# Patient Record
Sex: FEMALE | Race: BLACK OR AFRICAN AMERICAN | NOT HISPANIC OR LATINO | Employment: FULL TIME | URBAN - METROPOLITAN AREA
[De-identification: names, ages, dates, MRNs, and addresses within clinical notes are randomized per-mention and may not be internally consistent; named-entity substitution may affect disease eponyms.]

---

## 2019-11-18 ENCOUNTER — OFFICE VISIT (OUTPATIENT)
Dept: URGENT CARE | Facility: CLINIC | Age: 49
End: 2019-11-18
Payer: OTHER MISCELLANEOUS

## 2019-11-18 VITALS
TEMPERATURE: 97 F | RESPIRATION RATE: 16 BRPM | DIASTOLIC BLOOD PRESSURE: 95 MMHG | HEIGHT: 62 IN | BODY MASS INDEX: 24.48 KG/M2 | OXYGEN SATURATION: 98 % | WEIGHT: 133 LBS | HEART RATE: 61 BPM | SYSTOLIC BLOOD PRESSURE: 157 MMHG

## 2019-11-18 DIAGNOSIS — M54.2 NECK PAIN, BILATERAL: Primary | ICD-10-CM

## 2019-11-18 PROCEDURE — 99203 PR OFFICE/OUTPT VISIT, NEW, LEVL III, 30-44 MIN: ICD-10-PCS | Mod: S$GLB,,, | Performed by: PHYSICIAN ASSISTANT

## 2019-11-18 PROCEDURE — 99203 OFFICE O/P NEW LOW 30 MIN: CPT | Mod: S$GLB,,, | Performed by: PHYSICIAN ASSISTANT

## 2019-11-18 RX ORDER — ACETAMINOPHEN 500 MG
500 TABLET ORAL EVERY 6 HOURS PRN
COMMUNITY
End: 2019-11-19

## 2019-11-18 RX ORDER — MELOXICAM 7.5 MG/1
7.5 TABLET ORAL DAILY
COMMUNITY
End: 2019-11-22 | Stop reason: ALTCHOICE

## 2019-11-18 RX ORDER — IBUPROFEN 800 MG/1
800 TABLET ORAL 3 TIMES DAILY
COMMUNITY
End: 2019-11-22 | Stop reason: SDUPTHER

## 2019-11-18 RX ORDER — CYCLOBENZAPRINE HCL 5 MG
5 TABLET ORAL 3 TIMES DAILY PRN
COMMUNITY
End: 2019-12-27 | Stop reason: SDUPTHER

## 2019-11-18 NOTE — PATIENT INSTRUCTIONS
Neck Pain    There are several possible causes of neck pain when there is no injury:  · You can get a minor ligament sprain or muscle strain from a sudden minor neck movement. Sleeping with your neck in an awkward position can also cause this.  · Some people respond to emotional stress by tensing the muscles of their neck, shoulders, and upper back. Chronic spasm in these muscles can cause neck pain and sometimes headaches.  · Gradual wear and tear of the joints in the spine can cause degenerative arthritis. This can be a source of occasional or chronic neck pain.  · The spinal disks may bulge and put pressure on a nearby spinal nerve. This can happen as a natural result of aging or repeated small injuries to the neck. The spinal disks are the cushions between each spinal bone. This causes tingling, pain, or numbness that spreads from the neck to the shoulder, arm, or hand on one side.  Acute neck pain usually gets better in 1 to 2 weeks. Neck pain related to disk disease, arthritis in the spinal joints, or spinal stenosis can become chronic and last for months or years. Spinal stenosis is narrowing of the spinal canal.  X-rays are usually not ordered for the initial evaluation of neck pain. However, X-rays may be done if you had a forceful physical injury, such as a car accident or fall. If pain continues and doesnt respond to medical treatment, X-rays and other tests may be done at a later time.  Home care  · Rest and relax the muscles. Use a comfortable pillow that supports the head. It should also help keep the spine in a neutral position. The position of the head should not be tilted forward or backward. A rolled up towel may help for a custom fit.  · Some people find relief with heat. Heat can be applied with either a warm shower or bath or a moist towel heated in the microwave and massage. Others prefer cold packs. You can make an ice pack by filling a plastic bag that seals at the top with ice cubes or  crushed ice and then wrapping it with a thin towel. Try both and use the method that feels best for 15 to 20 minutes, several times a day.  · Whether using ice or heat, be careful that you do not injure your skin. Never put ice directly on the skin. Always wrap the ice in a towel or other type of cloth.This is very important, especially in people with poor skin sensations.   · Try to reduce your stress level. Emotional stress can lead to neck muscle tension and get in the way of or delay the healing process.  · You may use over-the-counter pain medicine to control pain, unless another medicine was prescribed. If you have chronic liver or kidney disease or ever had a stomach ulcer or GI bleeding, talk with your healthcare provider before using these medicines.  Follow-up care  Follow up with your healthcare provider if your symptoms do not show signs of improvement after one week. Physical therapy or further tests may be needed.  If X-rays, CT scans, or MRI scans were taken, you will be told of any new findings that may affect your care.  Call 911  Call 911 if you have:  · Sudden weakness or numbness in one or both arms  · Neck swelling, difficulty or painful swallowing  · Difficulty breathing  · Chest pain  When to seek medical advice  Call your healthcare provider right away if any of these occur:  · Pain becomes worse or spreads into one or both arm  · Increasing headache  · Fever of 100.4°F (38°C) or above lasting for 24 to 48 hours  Date Last Reviewed: 7/1/2016  © 4933-5520 [employer]. [address] [place] [address], [place] [address]. All rights reserved. This information is not intended as a substitute for professional medical care. Always follow your healthcare professional's instructions.      You have been seen for a work-related injury/ailment. Please return to work as instructed on discharge paperwork. If you have been given restrictions, please follow instructions has advised. It is important that  you follow up at one of the Occupational Health Clinics in the next business day if further treatment or evaluation is needed.   Please call [email or web address] for help setting up the appointment.  A list of clinics is listed below:    - [address], [place] [address] ([phone])  - [address] [address] ([phone])  - [address] [place] [address] ([phone])  - [address], [place] [address] ([phone])

## 2019-11-18 NOTE — PROGRESS NOTES
"Subjective:       Patient ID: Ann Marie Blue is a 49 y.o. female.    Vitals:  height is 5' 2" (1.575 m) and weight is 60.3 kg (133 lb). Her temperature is 97 °F (36.1 °C). Her blood pressure is 157/95 (abnormal) and her pulse is 61. Her respiration is 16 and oxygen saturation is 98%.     Chief Complaint: Back Pain (upper/lower)    Patient states that she was injured at work on 11/03 while trying to carry a heavy tray through a small space and pushing a heavy cart.  She reports pain and burning sensation there is in her upper neck and upper back bilaterally.  Pain does not radiate into extremities.  Pain is worse with movement or heavy lifting.  She denies any numbness/tingling, weakness, bowel or bladder incontinence.  Patient was already seen by her PCP for the injury.  She was given ibuprofen 800 mg as well as Flexeril.  She says that her symptoms improved greatly with medications however the Flexeril makes her drowsy should she does not take it all the time.  She also states that she has an appointment with a chiropractor.  Her company recently switched ownership and that is why she is now being seen for work comp injury.    Back Pain   This is a new problem. Episode onset: 10 days  The problem occurs intermittently. The problem has been gradually worsening since onset. The pain is present in the lumbar spine. The quality of the pain is described as burning. Radiates to: radiates to lower back  The pain is at a severity of 10/10. The pain is severe. The pain is worse during the day. The symptoms are aggravated by position. Stiffness is present all day. Pertinent negatives include no chest pain, dysuria, fever, headaches or weakness. She has tried NSAIDs, muscle relaxant and home exercises for the symptoms. The treatment provided mild relief.       Constitution: Negative for chills, fatigue and fever.   HENT: Negative for congestion and sore throat.    Neck: Negative for painful lymph nodes.   Cardiovascular: " Negative for chest pain and leg swelling.   Eyes: Negative for double vision and blurred vision.   Respiratory: Negative for cough and shortness of breath.    Gastrointestinal: Negative for nausea, vomiting and diarrhea.   Genitourinary: Negative for dysuria, frequency, urgency and history of kidney stones.   Musculoskeletal: Positive for back pain, muscle cramps and muscle ache. Negative for joint pain and joint swelling.   Skin: Negative for color change, pale, rash and bruising.   Allergic/Immunologic: Negative for seasonal allergies.   Neurological: Negative for dizziness, history of vertigo, light-headedness, passing out and headaches.   Hematologic/Lymphatic: Negative for swollen lymph nodes.   Psychiatric/Behavioral: Negative for nervous/anxious, sleep disturbance and depression. The patient is not nervous/anxious.        Objective:      Physical Exam   Constitutional: She is oriented to person, place, and time. Vital signs are normal. She appears well-developed and well-nourished. She is active and cooperative. No distress.   HENT:   Head: Normocephalic and atraumatic.   Nose: Nose normal.   Mouth/Throat: Oropharynx is clear and moist and mucous membranes are normal.   Eyes: Conjunctivae and lids are normal.   Neck: Trachea normal, normal range of motion, full passive range of motion without pain and phonation normal. Neck supple. Spinous process tenderness and muscular tenderness present. No neck rigidity. No edema and normal range of motion present.   Cardiovascular: Normal rate, regular rhythm, normal heart sounds, intact distal pulses and normal pulses.   Pulmonary/Chest: Effort normal and breath sounds normal.   Abdominal: Soft. Normal appearance and bowel sounds are normal. She exhibits no abdominal bruit, no pulsatile midline mass and no mass.   Musculoskeletal: She exhibits no edema or deformity.        Thoracic back: She exhibits tenderness. She exhibits normal range of motion, no swelling and no  spasm.        Lumbar back: Normal.   Neurological: She is alert and oriented to person, place, and time. She has normal strength and normal reflexes. No sensory deficit. Gait normal.   No Hahn's bilaterally.    Skin: Skin is warm, dry, intact and not diaphoretic.   Psychiatric: She has a normal mood and affect. Her speech is normal and behavior is normal. Judgment and thought content normal. Cognition and memory are normal.   Nursing note and vitals reviewed.        Assessment:       1. Neck pain, bilateral        Plan:       Patient instructed to continue the ibuprofen and Flexeril as needed for pain. Continue applying heat and doing daily exercises. She can [facility] up with [facility] if further treatment is needed and pain continues.  Otherwise return to full duty.    Neck pain, bilateral  -     Ambulatory referral to Occupational Medicine      Patient Instructions     Neck Pain    There are several possible causes of neck pain when there is no injury:  · You can get a minor ligament sprain or muscle strain from a sudden minor neck movement. Sleeping with your neck in an awkward position can also cause this.  · Some people respond to emotional stress by tensing the muscles of their neck, shoulders, and upper back. Chronic spasm in these muscles can cause neck pain and sometimes headaches.  · Gradual wear and tear of the joints in the spine can cause degenerative arthritis. This can be a source of occasional or chronic neck pain.  · The spinal disks may bulge and put pressure on a nearby spinal nerve. This can happen as a natural result of aging or repeated small injuries to the neck. The spinal disks are the cushions between each spinal bone. This causes tingling, pain, or numbness that spreads from the neck to the shoulder, arm, or hand on one side.  Acute neck pain usually gets better in 1 to 2 weeks. Neck pain related to disk disease, arthritis in the spinal joints, or spinal stenosis can become chronic and  last for months or years. Spinal stenosis is narrowing of the spinal canal.  X-rays are usually not ordered for the initial evaluation of neck pain. However, X-rays may be done if you had a forceful physical injury, such as a car accident or fall. If pain continues and doesnt respond to medical treatment, X-rays and other tests may be done at a later time.  Home care  · Rest and relax the muscles. Use a comfortable pillow that supports the head. It should also help keep the spine in a neutral position. The position of the head should not be tilted forward or backward. A rolled up towel may help for a custom fit.  · Some people find relief with heat. Heat can be applied with either a warm shower or bath or a moist towel heated in the microwave and massage. Others prefer cold packs. You can make an ice pack by filling a plastic bag that seals at the top with ice cubes or crushed ice and then wrapping it with a thin towel. Try both and use the method that feels best for 15 to 20 minutes, several times a day.  · Whether using ice or heat, be careful that you do not injure your skin. Never put ice directly on the skin. Always wrap the ice in a towel or other type of cloth.This is very important, especially in people with poor skin sensations.   · Try to reduce your stress level. Emotional stress can lead to neck muscle tension and get in the way of or delay the healing process.  · You may use over-the-counter pain medicine to control pain, unless another medicine was prescribed. If you have chronic liver or kidney disease or ever had a stomach ulcer or GI bleeding, talk with your healthcare provider before using these medicines.  Follow-up care  Follow up with your healthcare provider if your symptoms do not show signs of improvement after one week. Physical therapy or further tests may be needed.  If X-rays, CT scans, or MRI scans were taken, you will be told of any new findings that may affect your care.  Call  911  Call 911 if you have:  · Sudden weakness or numbness in one or both arms  · Neck swelling, difficulty or painful swallowing  · Difficulty breathing  · Chest pain  When to seek medical advice  Call your healthcare provider right away if any of these occur:  · Pain becomes worse or spreads into one or both arm  · Increasing headache  · Fever of 100.4°F (38°C) or above lasting for 24 to 48 hours  Date Last Reviewed: 7/1/2016  © 0747-0013 [employer]. [address] [place] [address]. All rights reserved. This information is not intended as a substitute for professional medical care. Always follow your healthcare professional's instructions.      You have been seen for a work-related injury/ailment. Please return to work as instructed on discharge paperwork. If you have been given restrictions, please follow instructions has advised. It is important that you follow up at one of the Occupational Health Clinics in the next business day if further treatment or evaluation is needed.   Please call [email or web address] for help setting up the appointment.  A list of clinics is listed below:    - [address] [facility] [address], [place] [address] ([phone])  - [address] [address] ([phone])  - [address] [place] [address] ([phone])  - [address], [place] [address] ([phone])

## 2019-11-18 NOTE — LETTER
[facility]  [address] [facility] [address]  Phone: [phone]  Fax: [phone]  [facility]: [email or web address]    Pt Name: Ann Marie Blue  Injury Date: 10/27/2019   Employee ID:  Date of First Treatment: 11/18/2019   Company: [employer]      Appointment Time: 10:30 AM Arrived: 11:00   Provider: Erin Segovia PA-C Time Out:11:45     Office Treatment:   1. Neck pain, bilateral          Patient Instructions: Daily home exercises/warm soaks    Restrictions: Regular Duty     Return Appointment: Follow up as needed

## 2019-11-19 ENCOUNTER — OFFICE VISIT (OUTPATIENT)
Dept: URGENT CARE | Facility: CLINIC | Age: 49
End: 2019-11-19
Payer: OTHER MISCELLANEOUS

## 2019-11-19 VITALS — BODY MASS INDEX: 24.33 KG/M2 | WEIGHT: 133 LBS

## 2019-11-19 DIAGNOSIS — M79.18 MYOFASCIAL MUSCLE PAIN: ICD-10-CM

## 2019-11-19 DIAGNOSIS — S16.1XXA STRAIN OF NECK MUSCLE, INITIAL ENCOUNTER: Primary | ICD-10-CM

## 2019-11-19 PROCEDURE — 99213 OFFICE O/P EST LOW 20 MIN: CPT | Mod: S$GLB,,, | Performed by: FAMILY MEDICINE

## 2019-11-19 PROCEDURE — 72040 XR CERVICAL SPINE 2 OR 3 VIEWS: ICD-10-PCS | Mod: S$GLB,,, | Performed by: RADIOLOGY

## 2019-11-19 PROCEDURE — 72040 X-RAY EXAM NECK SPINE 2-3 VW: CPT | Mod: S$GLB,,, | Performed by: RADIOLOGY

## 2019-11-19 PROCEDURE — 99213 PR OFFICE/OUTPT VISIT, EST, LEVL III, 20-29 MIN: ICD-10-PCS | Mod: S$GLB,,, | Performed by: FAMILY MEDICINE

## 2019-11-19 RX ORDER — METHOCARBAMOL 500 MG/1
500 TABLET, FILM COATED ORAL 4 TIMES DAILY
Qty: 40 TABLET | Refills: 0 | Status: SHIPPED | OUTPATIENT
Start: 2019-11-19 | End: 2019-11-19

## 2019-11-19 RX ORDER — ACETAMINOPHEN AND CODEINE PHOSPHATE 300; 60 MG/1; MG/1
TABLET ORAL
Refills: 0 | COMMUNITY
Start: 2019-11-06 | End: 2019-12-27 | Stop reason: SDUPTHER

## 2019-11-19 RX ORDER — METHOCARBAMOL 500 MG/1
500 TABLET, FILM COATED ORAL 4 TIMES DAILY
Qty: 40 TABLET | Refills: 0 | Status: SHIPPED | OUTPATIENT
Start: 2019-11-19 | End: 2019-11-22 | Stop reason: SDUPTHER

## 2019-11-19 RX ORDER — EMTRICITABINE AND TENOFOVIR DISOPROXIL FUMARATE 200; 300 MG/1; MG/1
TABLET, FILM COATED ORAL
Refills: 2 | COMMUNITY
Start: 2019-11-06

## 2019-11-19 NOTE — PROGRESS NOTES
Subjective:       Patient ID: Ann Marie Blue is a 49 y.o. female.    Chief Complaint: Work Related Injury    49-year-old woman who works as a  at the [employer] presents to Occupational Health for follow-up of a neck injury initially sustained on November 3rd.  Patient reports initially injuring her neck  when lifting a heavy object.  Patient reports bilateral neck pain associated with a burning sensation and crawling sensation.  Pain extends to patient's right shoulder and affects both sides of her mid back.  Patient initially saw her primary care physician who prescribed her ibuprofen and cyclobenzaprine.  Patient is also applying a heating pad using topical analgesics and stretching. Patient's work instructed her to attend Occupational Health for further review. Patient reports cyclobenzaprine makes her sleepy and she is unable to take it at work.    Injury   This is a new problem. Episode onset: 11/3. The problem occurs constantly. The problem has been gradually worsening. Associated symptoms include neck pain. Pertinent negatives include no abdominal pain, chest pain, chills, fever, headaches, nausea, rash, sore throat or vomiting. She has tried acetaminophen and NSAIDs (muscle relaxer) for the symptoms. The treatment provided mild relief.     Review of Systems   Constitution: Negative for chills and fever.   HENT: Negative for sore throat.    Eyes: Negative for blurred vision.   Cardiovascular: Negative for chest pain.   Respiratory: Negative for shortness of breath.    Skin: Negative for rash.   Musculoskeletal: Positive for joint pain, neck pain and stiffness. Negative for back pain.   Gastrointestinal: Negative for abdominal pain, diarrhea, nausea and vomiting.   Neurological: Negative for headaches.   Psychiatric/Behavioral: The patient is not nervous/anxious.        Objective:      Physical Exam   Constitutional: She is oriented to person, place, and time. She appears well-developed and  well-nourished.   HENT:   Head: Normocephalic and atraumatic.   Eyes: Pupils are equal, round, and reactive to light. Conjunctivae and EOM are normal.   Neck: Normal range of motion. Neck supple.   Musculoskeletal: Normal range of motion.        Cervical back: She exhibits tenderness and spasm. She exhibits normal range of motion.        Back:    Pt examined post muscle relaxant. Slight DROM throughout. Limited right  arm extension to 5*. B/l upper back tenderness a/w slight spasm   Neurological: She is alert and oriented to person, place, and time.   Skin: Skin is warm and dry.   Psychiatric: She has a normal mood and affect. Her behavior is normal. Judgment and thought content normal.             Assessment:       1. Strain of neck muscle, initial encounter    2. Myofascial muscle pain        Plan:         Medications Ordered This Encounter   Medications    methocarbamol (ROBAXIN) 500 MG Tab     Sig: Take 1 tablet (500 mg total) by mouth 4 (four) times daily. for 10 days     Dispense:  40 tablet     Refill:  0     Patient Instructions: Daily home exercises/warm soaks, PT to be scheduled once authorized, Begin Physical Therapy(Continue to use warm compress and topical cream. Continue to take ibuprofen. Continue to stretch. Take the new muscle relaxent I prescribed along with the ibuprofen.)   Restrictions: No lifting/pushing/pulling more than 10 lbs, No above the shoulder/overhead work(Pt is not to lift anything above the shoulder. Patient is to only carry weight less than 10 lbs. These restricitons are until the patient is reassessed by Occupational Health in 4 days.)  Follow up in about 3 days (around 11/22/2019).

## 2019-11-19 NOTE — LETTER
[facility] [place] [address] [facility], [place] [address]  Phone: [phone]  Fax: [phone]  [facility]: [email or web address]    Pt Name: Ann Marie Bule  Injury Date: 10/27/2019   Employee ID:  Date of First Treatment: 11/19/2019   Company: [employer]      Appointment Time: 01:45 PM Arrived: 1:47pm   Provider: [facility] Time Out:3:30pm     Office Treatment:   1. Strain of neck muscle, initial encounter    2. Myofascial muscle pain      Medications Ordered This Encounter   Medications    methocarbamol (ROBAXIN) 500 MG Tab      Patient Instructions: Daily home exercises/warm soaks, PT to be scheduled once authorized, Begin Physical Therapy(Continue to use warm compress and topical cream. Continue to take ibuprofen. Continue to stretch. Take the new muscle relaxent I prescribed along with the ibuprofen.)    Restrictions: No lifting/pushing/pulling more than 10 lbs, No above the shoulder/overhead work(Pt is not to lift anything above the shoulder. Patient is to only carry weight less than 10 lbs. These restricitons are until the patient is reassessed by Occupational Health in 4 days.)     Return Appointment: Visit date not found at ***

## 2019-11-19 NOTE — LETTER
[facility] [place] [address] [address] [facility], [place] [address]  Phone: [phone]  Fax: [phone]  [facility]: [email or web address]    Pt Name: Ann Marie Neal Date: 10/27/2019   Employee ID:  Date of First Treatment: 11/19/2019   Company: [employer]      Appointment Time: 01:45 PM Arrived: 1:47   Provider: [facility] Time Out:3:30pm     Office Treatment:   1. Strain of neck muscle, initial encounter    2. Myofascial muscle pain      Medications Ordered This Encounter   Medications    methocarbamol (ROBAXIN) 500 MG Tab      Patient Instructions: Daily home exercises/warm soaks, PT to be scheduled once authorized, Begin Physical Therapy(Continue to use warm compress and topical cream. Continue to take ibuprofen. Continue to stretch. Take the new muscle relaxent I prescribed along with the ibuprofen.)    Restrictions: No lifting/pushing/pulling more than 10 lbs, No above the shoulder/overhead work(Pt is not to lift anything above the shoulder. Patient is to only carry weight less than 10 lbs. These restricitons are until the patient is reassessed by Occupational Health in 4 days.)     Return Appointment: Nov 22 at 2:30 pm

## 2019-11-19 NOTE — LETTER
[facility] [place] [address] [address] [facility], [place] [address]  Phone: [phone]  Fax: [phone]  [facility]: [email or web address]    Pt Name: Ann Marie Neal Date: 11/03/2019   Employee ID:  Date of First Treatment: 11/19/2019   Company: [employer]      Appointment Time: 01:45 PM Arrived: 1:47   Provider: [facility] Time Out:3:30pm     Office Treatment:   1. Strain of neck muscle, initial encounter    2. Myofascial muscle pain      Medications Ordered This Encounter   Medications    methocarbamol (ROBAXIN) 500 MG Tab      Patient Instructions: Daily home exercises/warm soaks, PT to be scheduled once authorized, Begin Physical Therapy(Continue to use warm compress and topical cream. Continue to take ibuprofen. Continue to stretch. Take the new muscle relaxent I prescribed along with the ibuprofen.)    Restrictions: No lifting/pushing/pulling more than 10 lbs, No above the shoulder/overhead work(Pt is not to lift anything above the shoulder. Patient is to only carry weight less than 10 lbs. These restricitons are until the patient is reassessed by Occupational Health in 4 days.)     Return Appointment: 11/22/2019 at 2:30pm

## 2019-11-21 ENCOUNTER — TELEPHONE (OUTPATIENT)
Dept: URGENT CARE | Facility: CLINIC | Age: 49
End: 2019-11-21

## 2019-11-21 NOTE — TELEPHONE ENCOUNTER
Called patient to follow up from her visit, stated feeling a little better after being seen by [facility] since they changed her muscle relaxer

## 2019-11-22 ENCOUNTER — OFFICE VISIT (OUTPATIENT)
Dept: URGENT CARE | Facility: CLINIC | Age: 49
End: 2019-11-22
Payer: COMMERCIAL

## 2019-11-22 VITALS
WEIGHT: 133 LBS | HEIGHT: 62 IN | DIASTOLIC BLOOD PRESSURE: 86 MMHG | HEART RATE: 83 BPM | OXYGEN SATURATION: 98 % | BODY MASS INDEX: 24.48 KG/M2 | SYSTOLIC BLOOD PRESSURE: 122 MMHG

## 2019-11-22 DIAGNOSIS — M54.2 NECK PAIN, BILATERAL: ICD-10-CM

## 2019-11-22 DIAGNOSIS — S16.1XXA STRAIN OF NECK MUSCLE, INITIAL ENCOUNTER: Primary | ICD-10-CM

## 2019-11-22 DIAGNOSIS — M79.18 MYOFASCIAL MUSCLE PAIN: ICD-10-CM

## 2019-11-22 PROCEDURE — 99214 OFFICE O/P EST MOD 30 MIN: CPT | Mod: S$GLB,,, | Performed by: FAMILY MEDICINE

## 2019-11-22 PROCEDURE — 99214 PR OFFICE/OUTPT VISIT, EST, LEVL IV, 30-39 MIN: ICD-10-PCS | Mod: S$GLB,,, | Performed by: FAMILY MEDICINE

## 2019-11-22 RX ORDER — IBUPROFEN 800 MG/1
800 TABLET ORAL 3 TIMES DAILY
Qty: 60 TABLET | Refills: 0 | Status: SHIPPED | OUTPATIENT
Start: 2019-11-22 | End: 2019-11-29 | Stop reason: ALTCHOICE

## 2019-11-22 RX ORDER — METHOCARBAMOL 500 MG/1
500 TABLET, FILM COATED ORAL 4 TIMES DAILY
Qty: 80 TABLET | Refills: 0 | Status: SHIPPED | OUTPATIENT
Start: 2019-11-22 | End: 2019-12-12

## 2019-11-22 NOTE — LETTER
[facility] [place] [address] [facility], [place] [address]  Phone: [phone]  Fax: [phone]  [facility]: [email or web address]    Pt Name: Ann Marie Neal Date: 11/03/2019   Employee ID:  Date of First Treatment: 11/22/2019   Company: [employer]      Appointment Time: 02:15 PM Arrived: 3:05   Provider: Ayad Nicole MD Time Out:4:00pm     Office Treatment:   1. Strain of neck muscle, initial encounter    2. Myofascial muscle pain    3. Neck pain, bilateral          Patient Instructions: Daily home exercises/warm soaks, PT to be scheduled once authorized, Begin Physical Therapy(Warm compress. Topical Cream. Do Yoga stretches. Take your medicine..)    Restrictions: No lifting/pushing/pulling more than 10 lbs, No above the shoulder/overhead work((Pt is not to lift anything above the shoulder. Patient is to only carry weight less than 10 lbs. These restricitons are until the patient is reassessed by Occupational Health in 7 days.))     Return Appointment: 11/29/2019 at 12

## 2019-11-22 NOTE — PROGRESS NOTES
Subjective:       Patient ID: Ann Marie Blue is a 49 y.o. female.    Chief Complaint: Work Related Injury    Pt is her for right shoulder/neck pain. Things seem to be slowly improving. Pt has less burning sensation in the neck & shoulder today.    49-year-old female presents to Urgent Care for follow-up of her neck strain.  Patient feels much better since the time since her last visit.  Patient reports the Robaxin muscle relaxant does not make her as drowsy as the Flexeril.  Patient continues to take ibuprofen, use warm compress, and use topical creams.  Patient's daughter is a  who lives in [place].  Patient can turn to her daughter for advice on neck stretches.    Injury   This is a new problem. Episode onset: 11/3. The problem occurs intermittently. The problem has been gradually improving. Associated symptoms include neck pain and numbness. Pertinent negatives include no abdominal pain, chest pain, chills, fever, headaches, nausea, rash, sore throat or vomiting. She has tried NSAIDs, acetaminophen, heat and ice (muscle relaxer/topical) for the symptoms. The treatment provided mild relief.     Review of Systems   Constitution: Negative for chills and fever.   HENT: Negative for sore throat.    Eyes: Negative for blurred vision.   Cardiovascular: Negative for chest pain.   Respiratory: Negative for shortness of breath.    Skin: Negative for rash.   Musculoskeletal: Positive for joint pain and neck pain. Negative for back pain.   Gastrointestinal: Negative for abdominal pain, diarrhea, nausea and vomiting.   Neurological: Positive for numbness. Negative for headaches.   Psychiatric/Behavioral: The patient is not nervous/anxious.        Objective:      Physical Exam   Constitutional: She is oriented to person, place, and time. She appears well-developed and well-nourished.   HENT:   Head: Normocephalic and atraumatic.   Eyes: Pupils are equal, round, and reactive to light. Conjunctivae and EOM are  normal.   Neck: Normal range of motion. Neck supple.   Musculoskeletal: Normal range of motion.        Cervical back: She exhibits tenderness and spasm. She exhibits normal range of motion.        Back:    Pt examined post muscle relaxant. Slight DROM throughout. Limited right  arm extension to 5*. B/l upper back tenderness a/w slight spasm   Neurological: She is alert and oriented to person, place, and time.   Skin: Skin is warm and dry.   Psychiatric: She has a normal mood and affect. Her behavior is normal. Judgment and thought content normal.       Assessment:       1. Strain of neck muscle, initial encounter    2. Myofascial muscle pain    3. Neck pain, bilateral        Plan:       Medication adequately titrated.  Awaiting PT.  For follow-up in 1 week.  Work restrictions adequate.  Will consider lifting work restrictions at time of next visit.      Medications Ordered This Encounter   Medications    ibuprofen (ADVIL,MOTRIN) 800 MG tablet     Sig: Take 1 tablet (800 mg total) by mouth 3 (three) times daily. for 20 days     Dispense:  60 tablet     Refill:  0    methocarbamol (ROBAXIN) 500 MG Tab     Sig: Take 1 tablet (500 mg total) by mouth 4 (four) times daily. for 20 days     Dispense:  80 tablet     Refill:  0     Patient Instructions: Daily home exercises/warm soaks, PT to be scheduled once authorized, Begin Physical Therapy(Warm compress. Topical Cream. Do Yoga stretches. Take your medicine..)   Restrictions: No lifting/pushing/pulling more than 10 lbs, No above the shoulder/overhead work((Pt is not to lift anything above the shoulder. Patient is to only carry weight less than 10 lbs. These restricitons are until the patient is reassessed by Occupational Health in 7 days.))  Follow up in about 1 week (around 11/29/2019).

## 2019-11-29 ENCOUNTER — OFFICE VISIT (OUTPATIENT)
Dept: URGENT CARE | Facility: CLINIC | Age: 49
End: 2019-11-29
Payer: COMMERCIAL

## 2019-11-29 DIAGNOSIS — M54.2 NECK PAIN, BILATERAL: ICD-10-CM

## 2019-11-29 DIAGNOSIS — M79.18 MYOFASCIAL MUSCLE PAIN: ICD-10-CM

## 2019-11-29 DIAGNOSIS — S16.1XXD STRAIN OF NECK MUSCLE, SUBSEQUENT ENCOUNTER: Primary | ICD-10-CM

## 2019-11-29 DIAGNOSIS — S16.1XXA STRAIN OF NECK MUSCLE, INITIAL ENCOUNTER: ICD-10-CM

## 2019-11-29 PROCEDURE — 99213 PR OFFICE/OUTPT VISIT, EST, LEVL III, 20-29 MIN: ICD-10-PCS | Mod: S$GLB,,, | Performed by: FAMILY MEDICINE

## 2019-11-29 PROCEDURE — 99213 OFFICE O/P EST LOW 20 MIN: CPT | Mod: S$GLB,,, | Performed by: FAMILY MEDICINE

## 2019-11-29 RX ORDER — IBUPROFEN 800 MG/1
800 TABLET ORAL 3 TIMES DAILY
Qty: 60 TABLET | Refills: 0 | Status: SHIPPED | OUTPATIENT
Start: 2019-11-29 | End: 2019-12-19

## 2019-11-29 NOTE — LETTER
[facility] [place] [address] [address] [facility], [place] [address]  Phone: [phone]  Fax: [phone]  [facility]: [id number]    Pt Name: Ann Marie Blue  Injury Date: 11/03/2019   Employee ID:  Date of  Treatment: 11/29/2019   Company: [id number]      Appointment Time: 11:45 AM Arrived: 1157 AM   Provider: Ayad Nicole MD Time Out:1445 PM     Office Treatment:   EXAM  RX  DISABLED      1. Strain of neck muscle, subsequent encounter    2. Myofascial muscle pain    3. Neck pain, bilateral    4. Strain of neck muscle, initial encounter      Medications Ordered This Encounter   Medications    ibuprofen (ADVIL,MOTRIN) 800 MG tablet      Patient Instructions: Daily home exercises/warm soaks, PT to be scheduled once authorized, Begin Physical Therapy(Keep doing what you are doing. You are doing everything correctly. Take Nexium or Prilosec over the counter for your stomach pain. Take tylenol inbetween ibuprofen for pain.)      Restrictions: No above the shoulder/overhead work, No lifting/pushing/pulling more than 10 lbs, Avoid frequent bending/lifting/twisting(Pt is not to perform any reaching abover shoulder level, isn't to perform any lifting with her arms, is not to do any stretching while at work & is to perform absolutley no repetitive exercises like folding. If this can't be followed pt is to be disabled)     Return Appointment: 12/6/2019 at 0900 AM

## 2019-11-29 NOTE — PROGRESS NOTES
Subjective:       Patient ID: Ann Marie Blue is a 49 y.o. female.    Chief Complaint: Shoulder Injury (11/03/2019)    49-year-old female room  at the [facility] for follow-up of for right neck and shoulder injury.  Patient reports marginal improvement with current analgesic regimen.  However patient reports repetitive movements at work that is exacerbating her injury.  Including over head stretches and repetitive folding. Patient complains of burning and tenderness in her right posterior shoulder post work.  Symptoms are slightly alleviated with warm compress, hot showers, stretching, and topical analgesics Patient reports severe drowsiness pose Robaxin muscle relaxant for several days after ingesting medication.  Patient presently taking 1/3 the muscle relaxant per day.  Patient reports temporary relief with ibuprofen and Motrin. She prefers motrin.    Shoulder Injury    The incident occurred at work. Both shoulders are affected. The incident occurred 3 to 5 days ago. The injury mechanism was repetitive motion. The quality of the pain is described as aching and burning. The pain radiates to the right neck. The pain is at a severity of 10/10. The pain is severe. The symptoms are aggravated by movement and overhead lifting. She has tried ice and heat for the symptoms. The treatment provided no relief.     Review of Systems   Musculoskeletal: Positive for joint pain, neck pain and stiffness.       Objective:      Physical Exam   Constitutional: She is oriented to person, place, and time. She appears well-developed and well-nourished.   HENT:   Head: Normocephalic and atraumatic.   Eyes: Pupils are equal, round, and reactive to light. Conjunctivae and EOM are normal.   Neck: Normal range of motion. Neck supple.   Musculoskeletal: Normal range of motion.        Cervical back: She exhibits tenderness and spasm. She exhibits normal range of motion.        Back:    FROM throughout. SLight B/l upper back  tenderness.   Neurological: She is alert and oriented to person, place, and time.   Skin: Skin is warm and dry.   Psychiatric: She has a normal mood and affect. Her behavior is normal. Judgment and thought content normal.       Assessment:       1. Strain of neck muscle, subsequent encounter    2. Myofascial muscle pain    3. Neck pain, bilateral    4. Strain of neck muscle, initial encounter        Plan:         Medications Ordered This Encounter   Medications    ibuprofen (ADVIL,MOTRIN) 800 MG tablet     Sig: Take 1 tablet (800 mg total) by mouth 3 (three) times daily. for 20 days     Dispense:  60 tablet     Refill:  0     Patient Instructions: Daily home exercises/warm soaks, PT to be scheduled once authorized, Begin Physical Therapy(Keep doing what you are doing. You are doing everything correctly. Take Nexium or Prilosec over the counter for your stomach pain. Take tylenol inbetween ibuprofen for pain.)   Restrictions: No above the shoulder/overhead work, No lifting/pushing/pulling more than 10 lbs, Avoid frequent bending/lifting/twisting(Pt is not to perform any reaching abover shoulder level, isn't to perform any lifting with her arms, is not to do any stretching while at work & is to perform absolutley no repetitive exercises like folding. If this can't be followed pt is to be disabled)  Follow up in about 1 week (around 12/6/2019).

## 2019-12-06 ENCOUNTER — OFFICE VISIT (OUTPATIENT)
Dept: URGENT CARE | Facility: CLINIC | Age: 49
End: 2019-12-06
Payer: OTHER MISCELLANEOUS

## 2019-12-06 DIAGNOSIS — M54.2 NECK PAIN, BILATERAL: ICD-10-CM

## 2019-12-06 DIAGNOSIS — S16.1XXD STRAIN OF NECK MUSCLE, SUBSEQUENT ENCOUNTER: Primary | ICD-10-CM

## 2019-12-06 DIAGNOSIS — M79.18 MYOFASCIAL MUSCLE PAIN: ICD-10-CM

## 2019-12-06 PROCEDURE — 99213 OFFICE O/P EST LOW 20 MIN: CPT | Mod: S$GLB,,, | Performed by: FAMILY MEDICINE

## 2019-12-06 PROCEDURE — 99213 PR OFFICE/OUTPT VISIT, EST, LEVL III, 20-29 MIN: ICD-10-PCS | Mod: S$GLB,,, | Performed by: FAMILY MEDICINE

## 2019-12-06 NOTE — LETTER
[facility] [address], [address] [place] [address]  Phone: [phone]  Fax: [phone]  [facility]: [email or web address]    Pt Name: Ann Marie Neal Date: 11/03/2019   Employee ID: [id number] Date of  Treatment: 12/06/2019   Company: [employer]      Appointment Time: 08:45 AM Arrived: 9:06am   Provider: Ayad Nicole MD Time Out: 10:25am     Office Treatment:   EXAM  PENDING PT  MODIFIED DUTY    1. Strain of neck muscle, subsequent encounter    2. Myofascial muscle pain    3. Neck pain, bilateral          Patient Instructions: Elevated affected area, PT to be scheduled once authorized(Take your medication on a full stomach and take over the counter Nexium or Prilosec to avoid stomach pain. Continue to stretch, apply warm compress, and use topical cream as needed. You are improving greatly, but you need Physical Therapy asap)      Restrictions: Avoid frequent bending/lifting/twisting, No lifting/pushing/pulling more than 10 lbs, No above the shoulder/overhead work(Pt is not to perform any reaching abover shoulder level, isn't to perform any lifting with her arms, & is to perform absolutley no repetitive exercises like folding. If this can't be followed pt is to be disabled. Patient is to start Physical Therap asap)     Return Appointment: 12/16/19 @ 11:00AM

## 2019-12-06 NOTE — PROGRESS NOTES
"Subjective:       Patient ID: Ann Marie Blue is a 49 y.o. female.    Chief Complaint: Shoulder Injury    49-year-old female room  at the [facility] for follow-up of for right neck and shoulder injury.  Patient reports significant improvement sine last visit. Reports muscle "burning" has subsided and only has a 7/10 muscle pain. Patient reports full range of motion in her back and neck. Patient uses warm compress, hot showers, stretching, and topical analgesics.  Patient's daughter is  and she gets advice from her daughter.  Patient reports adequate pain therapy with ibuprofen and Robaxin once PRN.  Patient does not report need a prescription refill.  Patient reports work restrictions finally being honored as she has been moved to a  position.    Shoulder Injury    The incident occurred at work. Both shoulders are affected. The incident occurred more than 1 week ago. The injury mechanism was repetitive motion. The quality of the pain is described as aching. The pain radiates to the right neck and left neck. The pain is at a severity of 7/10. The pain is moderate. The symptoms are aggravated by overhead lifting. She has tried ice and heat for the symptoms. The treatment provided moderate relief.       Musculoskeletal: Positive for joint pain.        Objective:      Physical Exam   Constitutional: She is oriented to person, place, and time. She appears well-developed and well-nourished.   HENT:   Head: Normocephalic and atraumatic.   Eyes: Pupils are equal, round, and reactive to light. Conjunctivae and EOM are normal.   Neck: Normal range of motion. Neck supple.   Musculoskeletal: Normal range of motion.        Cervical back: She exhibits tenderness and spasm. She exhibits normal range of motion.        Back:    FROM throughout. SLight B/l upper back tenderness.   Neurological: She is alert and oriented to person, place, and time.   Skin: Skin is warm and dry.   Psychiatric: " She has a normal mood and affect. Her behavior is normal. Judgment and thought content normal.       Assessment:       1. Strain of neck muscle, subsequent encounter    2. Myofascial muscle pain    3. Neck pain, bilateral        Patient reports that her work is slow to respond to physician instructions.    Plan:       Plan is to keep the patient on present work restrictions until physical therapy is approved.  Current analgesic regimen is appropriate.  Patient is eager to return to regular work.     Patient Instructions: Elevated affected area, PT to be scheduled once authorized(Take your medication on a full stomach and take over the counter Nexium or Prilosec to avoid stomach pain. Continue to stretch, apply warm compress, and use topical cream as needed. You are improving greatly, but you need Physical Therapy asap)   Restrictions: Avoid frequent bending/lifting/twisting, No lifting/pushing/pulling more than 10 lbs, No above the shoulder/overhead work(Pt is not to perform any reaching abover shoulder level, isn't to perform any lifting with her arms, & is to perform absolutley no repetitive exercises like folding. If this can't be followed pt is to be disabled. Patient is to start Physical Therap asap)  Follow up in about 10 days (around 12/16/2019).

## 2019-12-16 ENCOUNTER — OFFICE VISIT (OUTPATIENT)
Dept: URGENT CARE | Facility: CLINIC | Age: 49
End: 2019-12-16
Payer: OTHER MISCELLANEOUS

## 2019-12-16 DIAGNOSIS — M79.18 MYOFASCIAL MUSCLE PAIN: ICD-10-CM

## 2019-12-16 DIAGNOSIS — S46.911D STRAIN OF RIGHT SHOULDER, SUBSEQUENT ENCOUNTER: ICD-10-CM

## 2019-12-16 DIAGNOSIS — M54.2 NECK PAIN, BILATERAL: ICD-10-CM

## 2019-12-16 DIAGNOSIS — S16.1XXD STRAIN OF NECK MUSCLE, SUBSEQUENT ENCOUNTER: Primary | ICD-10-CM

## 2019-12-16 PROCEDURE — 99213 PR OFFICE/OUTPT VISIT, EST, LEVL III, 20-29 MIN: ICD-10-PCS | Mod: 25,S$GLB,, | Performed by: FAMILY MEDICINE

## 2019-12-16 PROCEDURE — 99213 OFFICE O/P EST LOW 20 MIN: CPT | Mod: 25,S$GLB,, | Performed by: FAMILY MEDICINE

## 2019-12-16 NOTE — LETTER
[facility] [place] [address] [address] [facility], [address] [place] [address]  Phone: [phone]  Fax: [phone]  [facility]: [id number]    Pt Name: Ann Marie Neal Date: 11/03/2019   Employee ID:  Date of  Treatment: 12/16/2019   Company:[[employer]      Appointment Time: 10:45 AM Arrived:12:10 pm   Provider: Ayad Nicole MD Time Out     Office Treatment:   Exam  Light duty        1. Strain of neck muscle, subsequent encounter    2. Myofascial muscle pain    3. Neck pain, bilateral    4. Strain of right shoulder, subsequent encounter          Patient Instructions: Continue Physical Therapy, Daily home exercises/warm soaks      Restrictions: No lifting/pushing/pulling more than 10 lbs, No above the shoulder/overhead work, Limited use of right hand and arm, Avoid frequent bending/lifting/twisting(Patient is not to perform any reaching abover shoulder level. Patient is not to perform any lifting with her arms. Patient is not to perform any repetitive exercises like folding.)     Return Appointment: 12/27/2019 at 09:00   am

## 2019-12-16 NOTE — PROGRESS NOTES
"Subjective:       Patient ID: Ann Marie Blue is a 49 y.o. female.    Chief Complaint: Shoulder Injury      49-year-old female room  at the [facility] for follow-up of for neck, back, and shoulder injury.  Patient happy with PT x1 that she started 4 days prior. Reports muscle "burning" in her neck with right posterior suprascapular pain and right thorax pain. Patient reports full range of motion in her back and neck. Continues to utilize nonpharmacological treatments.  Patient reports adequate pain therapy with ibuprofen only.  Does not report need a prescription refill.  Patient optimistic she can return to regular work duty in 2-3 weeks.    Shoulder Injury    The incident occurred at work. The incident occurred more than 1 week ago. The injury mechanism was overhead work. The quality of the pain is described as burning and aching. The pain does not radiate. The pain is at a severity of 8/10. The pain is moderate. The symptoms are aggravated by overhead lifting. She has tried ice and heat for the symptoms. The treatment provided moderate relief.       Musculoskeletal: Positive for joint pain.        Objective:      Physical Exam   Constitutional: She is oriented to person, place, and time. She appears well-developed and well-nourished.   HENT:   Head: Normocephalic and atraumatic.   Eyes: Pupils are equal, round, and reactive to light. Conjunctivae and EOM are normal.   Neck: Normal range of motion. Neck supple.   Musculoskeletal: Normal range of motion.        Cervical back: She exhibits tenderness and spasm. She exhibits normal range of motion.        Back:         Arms:  FROM throughout. B/l upper back "burning".   Neurological: She is alert and oriented to person, place, and time.   Skin: Skin is warm and dry.   Psychiatric: She has a normal mood and affect. Her behavior is normal. Judgment and thought content normal.       Assessment:       1. Strain of neck muscle, subsequent encounter    2. " Myofascial muscle pain    3. Neck pain, bilateral    4. Strain of right shoulder, subsequent encounter        Optimistic patient    Plan:       Patient to return to clinic in 2 weeks to assess the effect of physical therapy that she recently started.  Patient's pain is managed adequately with ibuprofen.  Continue current work restrictions.     Patient Instructions: Continue Physical Therapy, Daily home exercises/warm soaks   Restrictions: No lifting/pushing/pulling more than 10 lbs, No above the shoulder/overhead work, Limited use of right hand and arm, Avoid frequent bending/lifting/twisting(Patient is not to perform any reaching abover shoulder level. Patient is not to perform any lifting with her arms. Patient is not to perform any repetitive exercises like folding.)  Follow up in about 11 days (around 12/27/2019).

## 2019-12-27 ENCOUNTER — OFFICE VISIT (OUTPATIENT)
Dept: URGENT CARE | Facility: CLINIC | Age: 49
End: 2019-12-27
Payer: OTHER MISCELLANEOUS

## 2019-12-27 DIAGNOSIS — S46.911D STRAIN OF RIGHT SHOULDER, SUBSEQUENT ENCOUNTER: ICD-10-CM

## 2019-12-27 DIAGNOSIS — M54.2 NECK PAIN, BILATERAL: ICD-10-CM

## 2019-12-27 DIAGNOSIS — S16.1XXD STRAIN OF NECK MUSCLE, SUBSEQUENT ENCOUNTER: Primary | ICD-10-CM

## 2019-12-27 DIAGNOSIS — S16.1XXA STRAIN OF NECK MUSCLE, INITIAL ENCOUNTER: ICD-10-CM

## 2019-12-27 DIAGNOSIS — M79.18 MYOFASCIAL MUSCLE PAIN: ICD-10-CM

## 2019-12-27 PROCEDURE — 99213 PR OFFICE/OUTPT VISIT, EST, LEVL III, 20-29 MIN: ICD-10-PCS | Mod: S$GLB,,, | Performed by: FAMILY MEDICINE

## 2019-12-27 PROCEDURE — 99213 OFFICE O/P EST LOW 20 MIN: CPT | Mod: S$GLB,,, | Performed by: FAMILY MEDICINE

## 2019-12-27 RX ORDER — CYCLOBENZAPRINE HCL 5 MG
5 TABLET ORAL 3 TIMES DAILY PRN
Qty: 60 TABLET | Refills: 0 | Status: SHIPPED | OUTPATIENT
Start: 2019-12-27 | End: 2020-01-16

## 2019-12-27 RX ORDER — ACETAMINOPHEN AND CODEINE PHOSPHATE 300; 60 MG/1; MG/1
1 TABLET ORAL EVERY 6 HOURS PRN
Qty: 40 TABLET | Refills: 0 | Status: SHIPPED | OUTPATIENT
Start: 2019-12-27 | End: 2020-01-06
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
## 2020-01-02 ENCOUNTER — OFFICE VISIT (OUTPATIENT)
Dept: URGENT CARE | Facility: CLINIC | Age: 50
End: 2020-01-02
Payer: OTHER MISCELLANEOUS

## 2020-01-02 DIAGNOSIS — M79.18 MYOFASCIAL MUSCLE PAIN: ICD-10-CM

## 2020-01-02 DIAGNOSIS — S16.1XXD STRAIN OF NECK MUSCLE, SUBSEQUENT ENCOUNTER: Primary | ICD-10-CM

## 2020-01-02 DIAGNOSIS — M54.2 NECK PAIN, BILATERAL: ICD-10-CM

## 2020-01-02 DIAGNOSIS — S46.911D STRAIN OF RIGHT SHOULDER, SUBSEQUENT ENCOUNTER: ICD-10-CM

## 2020-01-02 PROCEDURE — 99213 PR OFFICE/OUTPT VISIT, EST, LEVL III, 20-29 MIN: ICD-10-PCS | Mod: S$GLB,,, | Performed by: FAMILY MEDICINE

## 2020-01-02 PROCEDURE — 99213 OFFICE O/P EST LOW 20 MIN: CPT | Mod: S$GLB,,, | Performed by: FAMILY MEDICINE

## 2020-01-02 NOTE — PROGRESS NOTES
Subjective:       Patient ID: Ann Marie Blue is a 49 y.o. female.    Chief Complaint: Shoulder Injury    , [facility], DOI:11/3/19   CS    49-year-old highly motivated female room  at the [facility] for follow-up of for neck, back, and shoulder injury. Patient presents to [facility] 1 week post no duty. Pt feels much better. Reports FROM in her neck, back, and shoulder. Pt would like to go on regular duty and RTC in 2 weeks. Does not need a  rx refill.      Shoulder Injury    The incident occurred at work. Both shoulders are affected. The incident occurred more than 1 week ago. The injury mechanism was overhead work. The pain is at a severity of 0/10. The patient is experiencing no pain. Nothing aggravates the symptoms. She has tried nothing for the symptoms. The treatment provided significant relief.       Musculoskeletal: Positive for pain.        Objective:      Physical Exam   Constitutional: She is oriented to person, place, and time. She appears well-developed and well-nourished.   HENT:   Head: Normocephalic and atraumatic.   Eyes: Pupils are equal, round, and reactive to light. Conjunctivae and EOM are normal.   Neck: Normal range of motion. Neck supple.   Musculoskeletal: Normal range of motion.        Cervical back: She exhibits tenderness and pain. She exhibits normal range of motion, no bony tenderness, no swelling, no edema, no deformity and no laceration.        Back:    FROM. No tenderness with movement and palpation. Other areas of back are resolved   Neurological: She is alert and oriented to person, place, and time.   Skin: Skin is warm and dry.   Psychiatric: She has a normal mood and affect. Her behavior is normal. Judgment and thought content normal.       Assessment:       1. Strain of neck muscle, subsequent encounter    2. Myofascial muscle pain    3. Neck pain, bilateral    4. Strain of right shoulder, subsequent encounter        Plan:       RTC in 1 week with  possible d/c from [facility] on her return.     Patient Instructions: Continue Physical Therapy, Daily home exercises/warm soaks   Restrictions: Regular Duty(Patient is for regular duty until reassessed by Occupational Health in 1 week.)  Follow up in about 6 days (around 1/8/2020).

## 2020-01-02 NOTE — LETTER
[facility] [place] [address] [address] [facility], [place] [address]  Phone: [phone]  Fax: [phone]  [facility]: [id number]    Pt Name: Ann Marie Neal Date: 11/03/2019   Employee ID:  Date of Treatment: 01/02/2020   Company: [employer]      Appointment Time: 12:45 PM Arrived: 13:11 PM   Provider: Ayad Nicole MD Time Out:15:11 PM     Office Treatment:   EXAM  PT  REGULAR DUTY        1. Strain of neck muscle, subsequent encounter    2. Myofascial muscle pain    3. Neck pain, bilateral    4. Strain of right shoulder, subsequent encounter          Patient Instructions: Continue Physical Therapy, Daily home exercises/warm soaks      Restrictions: Regular Duty(Patient is for regular duty until reassessed by Occupational Health in 1 week.)     Return Appointment: 1/8/2020 at 13:00 PM

## 2020-01-08 ENCOUNTER — OFFICE VISIT (OUTPATIENT)
Dept: URGENT CARE | Facility: CLINIC | Age: 50
End: 2020-01-08
Payer: OTHER MISCELLANEOUS

## 2020-01-08 DIAGNOSIS — M54.2 NECK PAIN, BILATERAL: ICD-10-CM

## 2020-01-08 DIAGNOSIS — S16.1XXD STRAIN OF NECK MUSCLE, SUBSEQUENT ENCOUNTER: Primary | ICD-10-CM

## 2020-01-08 DIAGNOSIS — S46.911D STRAIN OF RIGHT SHOULDER, SUBSEQUENT ENCOUNTER: ICD-10-CM

## 2020-01-08 DIAGNOSIS — M79.18 MYOFASCIAL MUSCLE PAIN: ICD-10-CM

## 2020-01-08 PROCEDURE — 99213 OFFICE O/P EST LOW 20 MIN: CPT | Mod: S$GLB,,, | Performed by: FAMILY MEDICINE

## 2020-01-08 PROCEDURE — 99213 PR OFFICE/OUTPT VISIT, EST, LEVL III, 20-29 MIN: ICD-10-PCS | Mod: S$GLB,,, | Performed by: FAMILY MEDICINE

## 2020-01-08 NOTE — LETTER
[facility] [address] [facility], [place] [address]  Phone: [phone]  Fax: [phone]  [facility]: [email or web address]    Pt Name: Ann Marie Bleu  Injury Date: 11/03/2019   Employee ID:  Date of First Treatment: 01/08/2020   Company: [employer] reference to record [id number]      Appointment Time: 10:45 AM Arrived:    Provider: Ayad Nicole MD Time Ou     Office Treatment:   1. Strain of neck muscle, subsequent encounter    2. Myofascial muscle pain    3. Neck pain, bilateral    4. Strain of right shoulder, subsequent encounter          Patient Instructions: Attention not to aggravate affected area    Restrictions: Regular Duty, Discharged from Occupational Health     Return Appointment: Visit date not found at

## 2020-01-08 NOTE — LETTER
[facility] [place] [address] [address] [facility], [place] [address]  Phone: [phone]  Fax: [phone]  [facility]: [email or web address]    Pt Name: Ann Marie Blue  Injury Date: 11/03/2019   Employee ID:  Date of First Treatment: 01/08/2020   Company: [employer] reference to record [id number]      Appointment Time: 10:45 AM Arrived:13:24  PM   Provider: Ayad Nicole MD Time Out:14:37 PM     Office Treatment:   EXAM  DISCHARGE    1. Strain of neck muscle, subsequent encounter    2. Myofascial muscle pain    3. Neck pain, bilateral    4. Strain of right shoulder, subsequent encounter          Patient Instructions: Attention not to aggravate affected area      Restrictions: Regular Duty, Discharged from Occupational Health     Return Appointment: DISCHARGED

## 2020-01-08 NOTE — PROGRESS NOTES
Subjective:       Patient ID: Ann Marie Blue is a 49 y.o. female.    Chief Complaint: Shoulder Injury    , SHOULDER, DOI:11/03/2020    49-year-old highly motivated female room  at the [facility] for follow-up of for neck, back, and shoulder injury. Pt feels much better. She is ready to return to regular duty and be discharged from Occupational Medicine.    Shoulder Injury    The incident occurred at work. Both shoulders are affected. The incident occurred more than 1 week ago. The injury mechanism was overhead work. The pain is at a severity of 1/10. The patient is experiencing no pain. Nothing aggravates the symptoms. She has tried nothing for the symptoms. The treatment provided significant relief.       Musculoskeletal: Positive for pain.        Objective:      Physical Exam   Constitutional: She is oriented to person, place, and time. She appears well-developed and well-nourished.   HENT:   Head: Normocephalic and atraumatic.   Eyes: Pupils are equal, round, and reactive to light. Conjunctivae and EOM are normal.   Neck: Normal range of motion. Neck supple.   Musculoskeletal: Normal range of motion.        Right shoulder: She exhibits normal range of motion, no tenderness, no bony tenderness, no swelling, no effusion, no crepitus, no laceration, no pain, no spasm and normal strength.        Cervical back: She exhibits normal range of motion, no tenderness, no bony tenderness, no swelling, no edema, no deformity and no laceration.        Lumbar back: She exhibits normal range of motion, no tenderness, no bony tenderness, no swelling, no edema, no deformity, no laceration and no pain.   Neurological: She is alert and oriented to person, place, and time.   Skin: Skin is warm and dry.   Psychiatric: She has a normal mood and affect. Her behavior is normal. Judgment and thought content normal.       Assessment:       1. Strain of neck muscle, subsequent encounter    2. Myofascial muscle pain     3. Neck pain, bilateral    4. Strain of right shoulder, subsequent encounter        Plan:            Patient Instructions: Attention not to aggravate affected area   Restrictions: Regular Duty, Discharged from Occupational Health  Follow up if symptoms worsen or fail to improve.